# Patient Record
Sex: MALE | Race: WHITE | NOT HISPANIC OR LATINO | Employment: OTHER | URBAN - METROPOLITAN AREA
[De-identification: names, ages, dates, MRNs, and addresses within clinical notes are randomized per-mention and may not be internally consistent; named-entity substitution may affect disease eponyms.]

---

## 2022-09-22 ENCOUNTER — HOSPITAL ENCOUNTER (EMERGENCY)
Facility: CLINIC | Age: 62
Discharge: HOME | End: 2022-09-23
Attending: EMERGENCY MEDICINE | Admitting: EMERGENCY MEDICINE
Payer: COMMERCIAL

## 2022-09-22 DIAGNOSIS — R91.8 PULMONARY NODULES: ICD-10-CM

## 2022-09-22 DIAGNOSIS — G40.909 SEIZURE DISORDER (H): ICD-10-CM

## 2022-09-22 LAB
ATRIAL RATE - MUSE: 93 BPM
DIASTOLIC BLOOD PRESSURE - MUSE: NORMAL MMHG
INTERPRETATION ECG - MUSE: NORMAL
P AXIS - MUSE: 76 DEGREES
PR INTERVAL - MUSE: 152 MS
QRS DURATION - MUSE: 84 MS
QT - MUSE: 366 MS
QTC - MUSE: 455 MS
R AXIS - MUSE: 84 DEGREES
SYSTOLIC BLOOD PRESSURE - MUSE: NORMAL MMHG
T AXIS - MUSE: 68 DEGREES
VENTRICULAR RATE- MUSE: 93 BPM

## 2022-09-22 PROCEDURE — 96360 HYDRATION IV INFUSION INIT: CPT

## 2022-09-22 PROCEDURE — 99285 EMERGENCY DEPT VISIT HI MDM: CPT | Mod: 25

## 2022-09-22 PROCEDURE — 93005 ELECTROCARDIOGRAM TRACING: CPT

## 2022-09-23 ENCOUNTER — APPOINTMENT (OUTPATIENT)
Dept: CT IMAGING | Facility: CLINIC | Age: 62
End: 2022-09-23
Attending: EMERGENCY MEDICINE
Payer: COMMERCIAL

## 2022-09-23 VITALS
OXYGEN SATURATION: 98 % | RESPIRATION RATE: 23 BRPM | DIASTOLIC BLOOD PRESSURE: 85 MMHG | TEMPERATURE: 98 F | HEART RATE: 82 BPM | SYSTOLIC BLOOD PRESSURE: 149 MMHG | WEIGHT: 165 LBS

## 2022-09-23 LAB
ALBUMIN SERPL-MCNC: 3.6 G/DL (ref 3.4–5)
ALP SERPL-CCNC: 108 U/L (ref 40–150)
ALT SERPL W P-5'-P-CCNC: 20 U/L (ref 0–70)
ANION GAP SERPL CALCULATED.3IONS-SCNC: 8 MMOL/L (ref 3–14)
AST SERPL W P-5'-P-CCNC: 30 U/L (ref 0–45)
BASOPHILS # BLD AUTO: 0.1 10E3/UL (ref 0–0.2)
BASOPHILS NFR BLD AUTO: 1 %
BILIRUB SERPL-MCNC: 0.5 MG/DL (ref 0.2–1.3)
BUN SERPL-MCNC: 10 MG/DL (ref 7–30)
CALCIUM SERPL-MCNC: 8.4 MG/DL (ref 8.5–10.1)
CHLORIDE BLD-SCNC: 104 MMOL/L (ref 94–109)
CO2 SERPL-SCNC: 25 MMOL/L (ref 20–32)
CREAT SERPL-MCNC: 0.76 MG/DL (ref 0.66–1.25)
EOSINOPHIL # BLD AUTO: 0.1 10E3/UL (ref 0–0.7)
EOSINOPHIL NFR BLD AUTO: 2 %
ERYTHROCYTE [DISTWIDTH] IN BLOOD BY AUTOMATED COUNT: 14.3 % (ref 10–15)
ETHANOL SERPL-MCNC: <0.01 G/DL
GFR SERPL CREATININE-BSD FRML MDRD: >90 ML/MIN/1.73M2
GLUCOSE BLD-MCNC: 118 MG/DL (ref 70–99)
HCT VFR BLD AUTO: 44.6 % (ref 40–53)
HGB BLD-MCNC: 15.2 G/DL (ref 13.3–17.7)
HOLD SPECIMEN: NORMAL
HOLD SPECIMEN: NORMAL
IMM GRANULOCYTES # BLD: 0 10E3/UL
IMM GRANULOCYTES NFR BLD: 1 %
LYMPHOCYTES # BLD AUTO: 1.2 10E3/UL (ref 0.8–5.3)
LYMPHOCYTES NFR BLD AUTO: 19 %
MCH RBC QN AUTO: 32.4 PG (ref 26.5–33)
MCHC RBC AUTO-ENTMCNC: 34.1 G/DL (ref 31.5–36.5)
MCV RBC AUTO: 95 FL (ref 78–100)
MONOCYTES # BLD AUTO: 0.6 10E3/UL (ref 0–1.3)
MONOCYTES NFR BLD AUTO: 9 %
NEUTROPHILS # BLD AUTO: 4.5 10E3/UL (ref 1.6–8.3)
NEUTROPHILS NFR BLD AUTO: 68 %
NRBC # BLD AUTO: 0 10E3/UL
NRBC BLD AUTO-RTO: 0 /100
PLATELET # BLD AUTO: 315 10E3/UL (ref 150–450)
POTASSIUM BLD-SCNC: 3.8 MMOL/L (ref 3.4–5.3)
PROT SERPL-MCNC: 8.6 G/DL (ref 6.8–8.8)
RBC # BLD AUTO: 4.69 10E6/UL (ref 4.4–5.9)
SODIUM SERPL-SCNC: 137 MMOL/L (ref 133–144)
WBC # BLD AUTO: 6.5 10E3/UL (ref 4–11)

## 2022-09-23 PROCEDURE — 85025 COMPLETE CBC W/AUTO DIFF WBC: CPT | Performed by: EMERGENCY MEDICINE

## 2022-09-23 PROCEDURE — 80203 DRUG SCREEN QUANT ZONISAMIDE: CPT | Performed by: EMERGENCY MEDICINE

## 2022-09-23 PROCEDURE — 36415 COLL VENOUS BLD VENIPUNCTURE: CPT | Performed by: EMERGENCY MEDICINE

## 2022-09-23 PROCEDURE — 258N000003 HC RX IP 258 OP 636: Performed by: EMERGENCY MEDICINE

## 2022-09-23 PROCEDURE — 70450 CT HEAD/BRAIN W/O DYE: CPT

## 2022-09-23 PROCEDURE — 82077 ASSAY SPEC XCP UR&BREATH IA: CPT | Performed by: EMERGENCY MEDICINE

## 2022-09-23 PROCEDURE — 72125 CT NECK SPINE W/O DYE: CPT

## 2022-09-23 PROCEDURE — 80053 COMPREHEN METABOLIC PANEL: CPT | Performed by: EMERGENCY MEDICINE

## 2022-09-23 RX ADMIN — SODIUM CHLORIDE 1000 ML: 9 INJECTION, SOLUTION INTRAVENOUS at 00:11

## 2022-09-23 ASSESSMENT — ACTIVITIES OF DAILY LIVING (ADL): ADLS_ACUITY_SCORE: 35

## 2022-09-23 NOTE — ED NOTES
Bed: ED14  Expected date: 9/22/22  Expected time: 11:37 PM  Means of arrival: Ambulance  Comments:  Baljinder 531 61M seizure

## 2022-09-23 NOTE — ED TRIAGE NOTES
Pt had a possible seizure at the airport. Pt is from [place] and was going to [place]. Blood sugar was 102. Has a know seizure disorder. States last seizure was last Wednesday.

## 2022-09-23 NOTE — ED PROVIDER NOTES
History   Chief Complaint:  Seizures       HPI   Romel Mackey is a 61 year old male with history of seizures.  He states he started having seizures a few years ago.  His last seizure was September 7.  He was driving when he had the seizure and he was taken to a hospital in his hometown.  He has been on Zonegran 300 mg daily they did give him a load of Keppra in the emergency department and ultimately when I discharged him increase his Zonegran to 400 mg a day.  They did CT his head and neck which were fortunately negative.  He had an EEG that showed no active seizure.  The patient states that it could be due to his alcohol use why he has seizures.  He denies having alcohol withdrawal seizures however.  He does endorse drinking a beer tonight.  He is traveling from [place] to [place] to visit some friends and was waiting at the airport for the shuttle when he had the seizure.  He fell onto the ground.  The medics placed him in a cervical collar..  He was postictal for EMS but is readily clearing.  He does not have any apparent injuries.  The patient states that he has not had any fever, cough, nausea, vomiting, diarrhea, chest pain, shortness of breath.  He states that he has a neurologist but he has not had time to follow-up with them since his last visit to the emergency room.    Review of Systems  Please see the HPI the remaining 10 system review was negative.    Allergies:  No known drug allergies    Medications:    Zonegran  Lexapro    Past Medical History:     Seizures.    Past Surgical History:      No past surgical history on file.     Family History:      No family history on file.      Social History:  The patient presents to the ED with self  PCP: [unfilled]     Physical Exam     Patient Vitals for the past 24 hrs:   BP Temp Temp src Pulse Resp SpO2 Weight   09/22/22 2355 -- -- -- -- -- -- 74.8 kg (165 lb)   09/22/22 2352 -- 98  F (36.7  C) Oral -- -- -- --   09/22/22 2350 -- -- -- 92 18 97 %  --   09/22/22 2340 (!) 154/87 -- -- 97 -- -- --       Physical Exam      Physical Exam   Constitutional:  Patient is oriented to person, place, and time. They appear well-developed and well-nourished.    HENT:   Mouth/Throat:   Oropharynx is clear and moist. No tongue laceration  Eyes:    Conjunctivae normal and EOM are normal. Pupils are equal, round, and reactive to light.   Neck:    Patient is in a cervical collar.  He has a chronic nodule on the posterior cervical spine.  Cardiovascular: Normal rate, regular rhythm and normal heart sounds.  Exam reveals no gallop and no friction rub.  No murmur heard.  Pulmonary/Chest:  Effort normal and breath sounds normal. Patient has no wheezes. Patient has no rales.   Abdominal:   Soft. Bowel sounds are normal. Patient exhibits no mass. There is no tenderness. There is no rebound and no guarding.   Musculoskeletal:  Normal range of motion. Patient exhibits no edema.   Neurological:   Patient is alert and oriented to person, place, and time. Patient has normal strength. No cranial nerve deficit or sensory deficit. GCS 15 his blood sugar was 100  Skin:   Skin is warm and dry. No rash noted. No erythema.   Psychiatric:   Patient has a normal mood    Emergency Department Course   ECG  ECG results from 09/22/22   EKG 12-lead, tracing only     Value    Systolic Blood Pressure     Diastolic Blood Pressure     Ventricular Rate 93    Atrial Rate 93    AR Interval 152    QRS Duration 84        QTc 455    P Axis 76    R AXIS 84    T Axis 68    Interpretation ECG      Sinus rhythm  Normal ECG  No previous ECGs available  Confirmed by GENERATED REPORT, COMPUTER ([id number]),  Dee Iraheta ([id number]) on 9/22/2022 11:57:07 PM         Imaging:  CT Head w/o Contrast    (Results Pending)   CT Cervical Spine w/o Contrast    (Results Pending)     Report per radiology    Laboratory:  Labs Ordered and Resulted from Time of ED Arrival to Time of ED Departure   COMPREHENSIVE METABOLIC PANEL        Result Value    Sodium 137      Potassium 3.8      Chloride 104      Carbon Dioxide (CO2)        Anion Gap        Urea Nitrogen        Creatinine        Calcium        Glucose        Alkaline Phosphatase        AST        ALT        Protein Total        Albumin        Bilirubin Total        GFR Estimate       CBC WITH PLATELETS AND DIFFERENTIAL    WBC Count 6.5      RBC Count 4.69      Hemoglobin 15.2      Hematocrit 44.6      MCV 95      MCH 32.4      MCHC 34.1      RDW 14.3      Platelet Count 315      % Neutrophils 68      % Lymphocytes 19      % Monocytes 9      % Eosinophils 2      % Basophils 1      % Immature Granulocytes 1      NRBCs per 100 WBC 0      Absolute Neutrophils 4.5      Absolute Lymphocytes 1.2      Absolute Monocytes 0.6      Absolute Eosinophils 0.1      Absolute Basophils 0.1      Absolute Immature Granulocytes 0.0      Absolute NRBCs 0.0     ZONISAMIDE LEVEL QUANTITATIVE   ETHYL ALCOHOL LEVEL          Emergency Department Course:             Reviewed:  I reviewed nursing notes, vitals and past medical history      Interventions:  Medications   0.9% sodium chloride BOLUS (1,000 mLs Intravenous New Bag 9/23/22 0011)       Disposition:  The patient was discharged to home.     Impression & Plan     CMS Diagnoses: None      Medical Decision Making:    Romel Mackey is a 61 year old male with a history of seizures presenting after he had a seizure.  He felt when he had a seizure so was placed in a cervical collar by EMS.  His blood sugar on the scene was normal.  He was initially postictal for medics however by the time he got here he had cleared.  He did not have any apparent injury.  CT of his head and neck did not show any acute abnormalities.  There were incidental findings of pulmonary nodules and is in his upper lobes.  I did review his chart and he has been following with his primary doctor for this so there is no required imaging needed as he has had CTs for this before.  I did run a  seizure medication level however this will not come back in a timely manner.  At this time I feel the patient is safe for discharge.  He had a single seizure today he is fully returned to baseline.  He is on medications.  He has a neurologist he can follow-up with.  Seizure precautions were discussed.  At this time the patient feels comfortable being discharged we will get him back to the airport and he will proceed on with this trip.        Diagnosis:    ICD-10-CM    1. Seizure disorder (H)  G40.909        Discharge Medications:  New Prescriptions    No medications on file              Latisha Perez MD  09/23/22 0110

## 2022-09-24 LAB — ZONISAMIDE SERPL-MCNC: 11 UG/ML
